# Patient Record
Sex: MALE | Race: WHITE | NOT HISPANIC OR LATINO | URBAN - METROPOLITAN AREA
[De-identification: names, ages, dates, MRNs, and addresses within clinical notes are randomized per-mention and may not be internally consistent; named-entity substitution may affect disease eponyms.]

---

## 2022-09-11 ENCOUNTER — INPATIENT HOSPITAL (OUTPATIENT)
Dept: URBAN - METROPOLITAN AREA HOSPITAL 54 | Facility: HOSPITAL | Age: 77
End: 2022-09-11
Payer: OTHER GOVERNMENT

## 2022-09-11 DIAGNOSIS — K92.1 MELENA: ICD-10-CM

## 2022-09-11 DIAGNOSIS — D62 ACUTE POSTHEMORRHAGIC ANEMIA: ICD-10-CM

## 2022-09-11 DIAGNOSIS — R71.0 PRECIPITOUS DROP IN HEMATOCRIT: ICD-10-CM

## 2022-09-11 PROCEDURE — 99222 1ST HOSP IP/OBS MODERATE 55: CPT | Performed by: INTERNAL MEDICINE

## 2022-09-12 ENCOUNTER — INPATIENT HOSPITAL (OUTPATIENT)
Dept: URBAN - METROPOLITAN AREA HOSPITAL 54 | Facility: HOSPITAL | Age: 77
End: 2022-09-12
Payer: OTHER GOVERNMENT

## 2022-09-12 DIAGNOSIS — K25.9 GASTRIC ULCER, UNSPECIFIED AS ACUTE OR CHRONIC, WITHOUT HEMO: ICD-10-CM

## 2022-09-12 DIAGNOSIS — K92.1 MELENA: ICD-10-CM

## 2022-09-12 DIAGNOSIS — K64.8 OTHER HEMORRHOIDS: ICD-10-CM

## 2022-09-12 DIAGNOSIS — K29.30 CHRONIC SUPERFICIAL GASTRITIS WITHOUT BLEEDING: ICD-10-CM

## 2022-09-12 PROCEDURE — 43239 EGD BIOPSY SINGLE/MULTIPLE: CPT | Performed by: INTERNAL MEDICINE

## 2022-09-12 PROCEDURE — 45378 DIAGNOSTIC COLONOSCOPY: CPT | Performed by: INTERNAL MEDICINE

## 2022-09-13 ENCOUNTER — INPATIENT HOSPITAL (OUTPATIENT)
Dept: URBAN - METROPOLITAN AREA HOSPITAL 54 | Facility: HOSPITAL | Age: 77
End: 2022-09-13
Payer: OTHER GOVERNMENT

## 2022-09-13 DIAGNOSIS — K64.8 OTHER HEMORRHOIDS: ICD-10-CM

## 2022-09-13 DIAGNOSIS — K29.30 CHRONIC SUPERFICIAL GASTRITIS WITHOUT BLEEDING: ICD-10-CM

## 2022-09-13 DIAGNOSIS — K25.9 GASTRIC ULCER, UNSPECIFIED AS ACUTE OR CHRONIC, WITHOUT HEMO: ICD-10-CM

## 2022-09-13 DIAGNOSIS — D62 ACUTE POSTHEMORRHAGIC ANEMIA: ICD-10-CM

## 2022-09-13 PROCEDURE — 99232 SBSQ HOSP IP/OBS MODERATE 35: CPT | Performed by: PHYSICIAN ASSISTANT

## 2022-09-14 ENCOUNTER — INPATIENT HOSPITAL (OUTPATIENT)
Dept: URBAN - METROPOLITAN AREA HOSPITAL 54 | Facility: HOSPITAL | Age: 77
End: 2022-09-14
Payer: OTHER GOVERNMENT

## 2022-09-14 DIAGNOSIS — K25.9 GASTRIC ULCER, UNSPECIFIED AS ACUTE OR CHRONIC, WITHOUT HEMO: ICD-10-CM

## 2022-09-14 DIAGNOSIS — K29.30 CHRONIC SUPERFICIAL GASTRITIS WITHOUT BLEEDING: ICD-10-CM

## 2022-09-14 DIAGNOSIS — K64.8 OTHER HEMORRHOIDS: ICD-10-CM

## 2022-09-14 DIAGNOSIS — D62 ACUTE POSTHEMORRHAGIC ANEMIA: ICD-10-CM

## 2022-09-14 PROCEDURE — 99232 SBSQ HOSP IP/OBS MODERATE 35: CPT

## 2022-09-22 ENCOUNTER — INPATIENT HOSPITAL (OUTPATIENT)
Dept: URBAN - METROPOLITAN AREA HOSPITAL 54 | Facility: HOSPITAL | Age: 77
End: 2022-09-22
Payer: OTHER GOVERNMENT

## 2022-09-22 DIAGNOSIS — K92.1 MELENA: ICD-10-CM

## 2022-09-22 DIAGNOSIS — K25.9 GASTRIC ULCER, UNSPECIFIED AS ACUTE OR CHRONIC, WITHOUT HEMO: ICD-10-CM

## 2022-09-22 DIAGNOSIS — Z98.890 OTHER SPECIFIED POSTPROCEDURAL STATES: ICD-10-CM

## 2022-09-22 DIAGNOSIS — K31.89 OTHER DISEASES OF STOMACH AND DUODENUM: ICD-10-CM

## 2022-09-22 DIAGNOSIS — D62 ACUTE POSTHEMORRHAGIC ANEMIA: ICD-10-CM

## 2022-09-22 PROCEDURE — 91110 GI TRC IMG INTRAL ESOPH-ILE: CPT | Mod: 26 | Performed by: INTERNAL MEDICINE

## 2022-09-22 PROCEDURE — 44361 SMALL BOWEL ENDOSCOPY/BIOPSY: CPT | Performed by: INTERNAL MEDICINE

## 2022-09-22 PROCEDURE — 99222 1ST HOSP IP/OBS MODERATE 55: CPT | Mod: 25 | Performed by: PHYSICIAN ASSISTANT

## 2022-09-28 ENCOUNTER — OFFICE (OUTPATIENT)
Dept: URBAN - METROPOLITAN AREA CLINIC 18 | Facility: CLINIC | Age: 77
End: 2022-09-28
Payer: OTHER GOVERNMENT

## 2022-09-28 VITALS
HEIGHT: 67 IN | HEART RATE: 72 BPM | SYSTOLIC BLOOD PRESSURE: 140 MMHG | WEIGHT: 185 LBS | DIASTOLIC BLOOD PRESSURE: 66 MMHG

## 2022-09-28 DIAGNOSIS — K62.5 HEMORRHAGE OF ANUS AND RECTUM: ICD-10-CM

## 2022-09-28 LAB
CBC, PLATELET CT  AND  DIFF: ABS BASOPHIL: 0.1 K/UL
CBC, PLATELET CT  AND  DIFF: ABS EOSINOPHIL: 0.2 K/UL
CBC, PLATELET CT  AND  DIFF: ABS IMMATURE GRANS: 0 K/UL
CBC, PLATELET CT  AND  DIFF: ABS LYMPHOCYTE: 1 K/UL
CBC, PLATELET CT  AND  DIFF: ABS MONOCYTE: 0.7 K/UL
CBC, PLATELET CT  AND  DIFF: ABS NEUTROPHIL: 4.1 K/UL
CBC, PLATELET CT  AND  DIFF: BASOPHIL: 1 %
CBC, PLATELET CT  AND  DIFF: DIFFERENTIAL: (no result)
CBC, PLATELET CT  AND  DIFF: EOSINOPHIL: 2.7 %
CBC, PLATELET CT  AND  DIFF: HEMATOCRIT: 27 % — LOW
CBC, PLATELET CT  AND  DIFF: HEMOGLOBIN: 8.7 G/DL — LOW
CBC, PLATELET CT  AND  DIFF: IMMATURE GRANULOCYTES: 0.5 %
CBC, PLATELET CT  AND  DIFF: LYMPHOCYTE: 16.1 %
CBC, PLATELET CT  AND  DIFF: MCH: 28.2 PG
CBC, PLATELET CT  AND  DIFF: MCHC: 32.2 G/DL
CBC, PLATELET CT  AND  DIFF: MCV: 87.4 FL
CBC, PLATELET CT  AND  DIFF: MONOCYTE: 10.8 %
CBC, PLATELET CT  AND  DIFF: MPV: 10.2 FL
CBC, PLATELET CT  AND  DIFF: NEUTROPHIL: 68.9 %
CBC, PLATELET CT  AND  DIFF: NRBCS: 0 /100 WBC
CBC, PLATELET CT  AND  DIFF: PLATELET COUNT: 388 K/UL
CBC, PLATELET CT  AND  DIFF: RBC: 3.09 M/UL — LOW
CBC, PLATELET CT  AND  DIFF: RDW: 15.2 % — HIGH
CBC, PLATELET CT  AND  DIFF: WBC COUNT: 6 K/UL
IRON PROFILE: IRON BINDING CAPACITY: 290 MCG/DL
IRON PROFILE: IRON SATURATION: 24 %
IRON PROFILE: IRON: 69 MCG/DL

## 2022-09-28 PROCEDURE — 99214 OFFICE O/P EST MOD 30 MIN: CPT | Performed by: INTERNAL MEDICINE

## 2022-11-15 ENCOUNTER — OFFICE (OUTPATIENT)
Dept: URBAN - METROPOLITAN AREA CLINIC 18 | Facility: CLINIC | Age: 77
End: 2022-11-15
Payer: OTHER GOVERNMENT

## 2022-11-15 VITALS
WEIGHT: 186 LBS | OXYGEN SATURATION: 96 % | SYSTOLIC BLOOD PRESSURE: 162 MMHG | HEIGHT: 67 IN | DIASTOLIC BLOOD PRESSURE: 86 MMHG | HEART RATE: 63 BPM

## 2022-11-15 DIAGNOSIS — K62.5 HEMORRHAGE OF ANUS AND RECTUM: ICD-10-CM

## 2022-11-15 PROCEDURE — 99214 OFFICE O/P EST MOD 30 MIN: CPT | Performed by: INTERNAL MEDICINE

## 2023-01-03 ENCOUNTER — AMBULATORY SURGICAL CENTER (OUTPATIENT)
Dept: URBAN - METROPOLITAN AREA SURGERY 7 | Facility: SURGERY | Age: 78
End: 2023-01-03
Payer: OTHER GOVERNMENT

## 2023-01-03 DIAGNOSIS — D50.9 IRON DEFICIENCY ANEMIA, UNSPECIFIED: ICD-10-CM

## 2023-01-03 DIAGNOSIS — K64.0 FIRST DEGREE HEMORRHOIDS: ICD-10-CM

## 2023-01-03 DIAGNOSIS — K92.2 GASTROINTESTINAL HEMORRHAGE, UNSPECIFIED: ICD-10-CM

## 2023-01-03 PROCEDURE — 43235 EGD DIAGNOSTIC BRUSH WASH: CPT | Performed by: INTERNAL MEDICINE

## 2023-01-03 PROCEDURE — 45378 DIAGNOSTIC COLONOSCOPY: CPT | Mod: 53 | Performed by: INTERNAL MEDICINE

## 2023-01-03 PROCEDURE — 45378 DIAGNOSTIC COLONOSCOPY: CPT | Mod: 74 | Performed by: INTERNAL MEDICINE

## 2023-01-03 NOTE — SERVICEHPINOTES
76-year-old male comes for evaluation of recent GI bleed. Patient was admitted to the hospital with rectal bleeding, significant drop in hemoglobin requiring 2 units of packed red blood cells, hemoglobin went down to almost 6 range earlier in September 2022, s/p 2 units of packed red blood cells and IV iron. He underwent endoscopy as noted belowbrEGD September 2022 showed clean base antral ulcer.brGastric biopsies negative for H. pyloribrColonoscopy September 2022 with internal hemorrhoids, large amount of old blood clots.brSmall bowel capsule endoscopy September 2022 showed normal small bowel exam to cecum. 
br   no symptoms.

## 2023-02-07 VITALS
HEART RATE: 89 BPM | OXYGEN SATURATION: 99 % | OXYGEN SATURATION: 95 % | DIASTOLIC BLOOD PRESSURE: 69 MMHG | SYSTOLIC BLOOD PRESSURE: 165 MMHG | SYSTOLIC BLOOD PRESSURE: 131 MMHG | SYSTOLIC BLOOD PRESSURE: 107 MMHG | DIASTOLIC BLOOD PRESSURE: 72 MMHG | OXYGEN SATURATION: 98 % | HEART RATE: 66 BPM | OXYGEN SATURATION: 94 % | DIASTOLIC BLOOD PRESSURE: 62 MMHG | OXYGEN SATURATION: 96 % | HEART RATE: 89 BPM | RESPIRATION RATE: 12 BRPM | OXYGEN SATURATION: 94 % | DIASTOLIC BLOOD PRESSURE: 84 MMHG | RESPIRATION RATE: 13 BRPM | OXYGEN SATURATION: 95 % | SYSTOLIC BLOOD PRESSURE: 114 MMHG | DIASTOLIC BLOOD PRESSURE: 67 MMHG | SYSTOLIC BLOOD PRESSURE: 122 MMHG | HEART RATE: 64 BPM | DIASTOLIC BLOOD PRESSURE: 91 MMHG | OXYGEN SATURATION: 96 % | SYSTOLIC BLOOD PRESSURE: 127 MMHG | RESPIRATION RATE: 16 BRPM | HEART RATE: 66 BPM | RESPIRATION RATE: 13 BRPM | SYSTOLIC BLOOD PRESSURE: 123 MMHG | DIASTOLIC BLOOD PRESSURE: 73 MMHG | HEART RATE: 65 BPM | DIASTOLIC BLOOD PRESSURE: 62 MMHG | RESPIRATION RATE: 18 BRPM | SYSTOLIC BLOOD PRESSURE: 104 MMHG | HEART RATE: 65 BPM | SYSTOLIC BLOOD PRESSURE: 114 MMHG | SYSTOLIC BLOOD PRESSURE: 165 MMHG | DIASTOLIC BLOOD PRESSURE: 91 MMHG | SYSTOLIC BLOOD PRESSURE: 122 MMHG | HEART RATE: 68 BPM | DIASTOLIC BLOOD PRESSURE: 67 MMHG | SYSTOLIC BLOOD PRESSURE: 127 MMHG | TEMPERATURE: 98.3 F | DIASTOLIC BLOOD PRESSURE: 73 MMHG | RESPIRATION RATE: 15 BRPM | OXYGEN SATURATION: 99 % | HEIGHT: 67 IN | TEMPERATURE: 98.3 F | SYSTOLIC BLOOD PRESSURE: 105 MMHG | SYSTOLIC BLOOD PRESSURE: 157 MMHG | HEART RATE: 69 BPM | RESPIRATION RATE: 14 BRPM | SYSTOLIC BLOOD PRESSURE: 123 MMHG | HEART RATE: 64 BPM | SYSTOLIC BLOOD PRESSURE: 105 MMHG | RESPIRATION RATE: 12 BRPM | DIASTOLIC BLOOD PRESSURE: 72 MMHG | OXYGEN SATURATION: 93 % | HEART RATE: 74 BPM | HEART RATE: 69 BPM | DIASTOLIC BLOOD PRESSURE: 84 MMHG | RESPIRATION RATE: 16 BRPM | RESPIRATION RATE: 18 BRPM | RESPIRATION RATE: 15 BRPM | OXYGEN SATURATION: 98 % | SYSTOLIC BLOOD PRESSURE: 104 MMHG | HEART RATE: 74 BPM | DIASTOLIC BLOOD PRESSURE: 69 MMHG | SYSTOLIC BLOOD PRESSURE: 157 MMHG | OXYGEN SATURATION: 93 % | HEIGHT: 67 IN | SYSTOLIC BLOOD PRESSURE: 107 MMHG | SYSTOLIC BLOOD PRESSURE: 131 MMHG | HEART RATE: 68 BPM | RESPIRATION RATE: 14 BRPM

## 2023-04-06 ENCOUNTER — OFFICE (OUTPATIENT)
Dept: URBAN - METROPOLITAN AREA CLINIC 18 | Facility: CLINIC | Age: 78
End: 2023-04-06
Payer: OTHER GOVERNMENT

## 2023-04-06 VITALS
DIASTOLIC BLOOD PRESSURE: 84 MMHG | WEIGHT: 186 LBS | HEIGHT: 67 IN | HEART RATE: 63 BPM | SYSTOLIC BLOOD PRESSURE: 126 MMHG

## 2023-04-06 DIAGNOSIS — D62 ACUTE POSTHEMORRHAGIC ANEMIA: ICD-10-CM

## 2023-04-06 DIAGNOSIS — K59.00 CONSTIPATION, UNSPECIFIED: ICD-10-CM

## 2023-04-06 DIAGNOSIS — K62.5 HEMORRHAGE OF ANUS AND RECTUM: ICD-10-CM

## 2023-04-06 PROCEDURE — 99213 OFFICE O/P EST LOW 20 MIN: CPT | Performed by: NURSE PRACTITIONER

## 2023-05-12 ENCOUNTER — AMBULATORY SURGICAL CENTER (OUTPATIENT)
Dept: URBAN - METROPOLITAN AREA SURGERY 7 | Facility: SURGERY | Age: 78
End: 2023-05-12

## 2023-05-12 VITALS
OXYGEN SATURATION: 94 % | SYSTOLIC BLOOD PRESSURE: 160 MMHG | WEIGHT: 185 LBS | OXYGEN SATURATION: 99 % | SYSTOLIC BLOOD PRESSURE: 157 MMHG | HEART RATE: 58 BPM | SYSTOLIC BLOOD PRESSURE: 126 MMHG | SYSTOLIC BLOOD PRESSURE: 129 MMHG | DIASTOLIC BLOOD PRESSURE: 75 MMHG | DIASTOLIC BLOOD PRESSURE: 72 MMHG | SYSTOLIC BLOOD PRESSURE: 113 MMHG | RESPIRATION RATE: 21 BRPM | SYSTOLIC BLOOD PRESSURE: 132 MMHG | TEMPERATURE: 97.3 F | HEART RATE: 60 BPM | DIASTOLIC BLOOD PRESSURE: 64 MMHG | SYSTOLIC BLOOD PRESSURE: 121 MMHG | SYSTOLIC BLOOD PRESSURE: 115 MMHG | HEART RATE: 54 BPM | DIASTOLIC BLOOD PRESSURE: 72 MMHG | DIASTOLIC BLOOD PRESSURE: 76 MMHG | OXYGEN SATURATION: 100 % | RESPIRATION RATE: 19 BRPM | HEART RATE: 57 BPM | HEART RATE: 59 BPM | HEART RATE: 61 BPM | DIASTOLIC BLOOD PRESSURE: 86 MMHG | OXYGEN SATURATION: 98 % | HEART RATE: 56 BPM | SYSTOLIC BLOOD PRESSURE: 133 MMHG | SYSTOLIC BLOOD PRESSURE: 169 MMHG | HEART RATE: 54 BPM | OXYGEN SATURATION: 96 % | HEART RATE: 58 BPM | RESPIRATION RATE: 9 BRPM | HEART RATE: 66 BPM | HEIGHT: 67 IN | OXYGEN SATURATION: 100 % | SYSTOLIC BLOOD PRESSURE: 133 MMHG | RESPIRATION RATE: 9 BRPM | HEART RATE: 61 BPM | SYSTOLIC BLOOD PRESSURE: 169 MMHG | OXYGEN SATURATION: 97 % | DIASTOLIC BLOOD PRESSURE: 64 MMHG | OXYGEN SATURATION: 98 % | HEART RATE: 59 BPM | RESPIRATION RATE: 16 BRPM | SYSTOLIC BLOOD PRESSURE: 154 MMHG | SYSTOLIC BLOOD PRESSURE: 132 MMHG | RESPIRATION RATE: 23 BRPM | HEART RATE: 67 BPM | OXYGEN SATURATION: 94 % | OXYGEN SATURATION: 99 % | DIASTOLIC BLOOD PRESSURE: 54 MMHG | SYSTOLIC BLOOD PRESSURE: 160 MMHG | SYSTOLIC BLOOD PRESSURE: 124 MMHG | SYSTOLIC BLOOD PRESSURE: 121 MMHG | HEART RATE: 66 BPM | HEART RATE: 57 BPM | HEART RATE: 69 BPM | SYSTOLIC BLOOD PRESSURE: 113 MMHG | SYSTOLIC BLOOD PRESSURE: 154 MMHG | DIASTOLIC BLOOD PRESSURE: 78 MMHG | DIASTOLIC BLOOD PRESSURE: 70 MMHG | RESPIRATION RATE: 20 BRPM | HEART RATE: 67 BPM | SYSTOLIC BLOOD PRESSURE: 119 MMHG | DIASTOLIC BLOOD PRESSURE: 68 MMHG | DIASTOLIC BLOOD PRESSURE: 81 MMHG | RESPIRATION RATE: 23 BRPM | DIASTOLIC BLOOD PRESSURE: 75 MMHG | HEART RATE: 60 BPM | SYSTOLIC BLOOD PRESSURE: 115 MMHG | RESPIRATION RATE: 21 BRPM | DIASTOLIC BLOOD PRESSURE: 76 MMHG | DIASTOLIC BLOOD PRESSURE: 70 MMHG | DIASTOLIC BLOOD PRESSURE: 74 MMHG | DIASTOLIC BLOOD PRESSURE: 78 MMHG | SYSTOLIC BLOOD PRESSURE: 119 MMHG | HEIGHT: 67 IN | HEART RATE: 69 BPM | RESPIRATION RATE: 19 BRPM | OXYGEN SATURATION: 97 % | SYSTOLIC BLOOD PRESSURE: 126 MMHG | SYSTOLIC BLOOD PRESSURE: 129 MMHG | TEMPERATURE: 97.3 F | DIASTOLIC BLOOD PRESSURE: 86 MMHG | SYSTOLIC BLOOD PRESSURE: 124 MMHG | WEIGHT: 185 LBS | RESPIRATION RATE: 16 BRPM | DIASTOLIC BLOOD PRESSURE: 69 MMHG | SYSTOLIC BLOOD PRESSURE: 157 MMHG | DIASTOLIC BLOOD PRESSURE: 69 MMHG | OXYGEN SATURATION: 96 % | DIASTOLIC BLOOD PRESSURE: 54 MMHG | HEART RATE: 56 BPM | DIASTOLIC BLOOD PRESSURE: 81 MMHG | RESPIRATION RATE: 20 BRPM | DIASTOLIC BLOOD PRESSURE: 74 MMHG | DIASTOLIC BLOOD PRESSURE: 68 MMHG

## 2023-05-12 DIAGNOSIS — D50.9 IRON DEFICIENCY ANEMIA, UNSPECIFIED: ICD-10-CM

## 2023-05-12 DIAGNOSIS — K62.5 HEMORRHAGE OF ANUS AND RECTUM: ICD-10-CM

## 2023-05-12 DIAGNOSIS — K64.0 FIRST DEGREE HEMORRHOIDS: ICD-10-CM

## 2023-05-12 PROCEDURE — 45378 DIAGNOSTIC COLONOSCOPY: CPT | Mod: 22 | Performed by: INTERNAL MEDICINE

## 2023-08-18 ENCOUNTER — OFFICE (OUTPATIENT)
Dept: URBAN - METROPOLITAN AREA CLINIC 18 | Facility: CLINIC | Age: 78
End: 2023-08-18

## 2023-08-18 VITALS
SYSTOLIC BLOOD PRESSURE: 130 MMHG | WEIGHT: 185 LBS | DIASTOLIC BLOOD PRESSURE: 88 MMHG | HEIGHT: 67 IN | HEART RATE: 71 BPM

## 2023-08-18 DIAGNOSIS — K62.5 HEMORRHAGE OF ANUS AND RECTUM: ICD-10-CM

## 2023-08-18 DIAGNOSIS — K25.9 GASTRIC ULCER, UNSPECIFIED AS ACUTE OR CHRONIC, WITHOUT HEMO: ICD-10-CM

## 2023-08-18 DIAGNOSIS — D64.9 ANEMIA, UNSPECIFIED: ICD-10-CM

## 2023-08-18 DIAGNOSIS — K64.0 FIRST DEGREE HEMORRHOIDS: ICD-10-CM

## 2023-08-18 PROCEDURE — 99213 OFFICE O/P EST LOW 20 MIN: CPT | Performed by: NURSE PRACTITIONER

## 2023-11-21 ENCOUNTER — OFFICE (OUTPATIENT)
Dept: URBAN - METROPOLITAN AREA CLINIC 18 | Facility: CLINIC | Age: 78
End: 2023-11-21

## 2023-11-21 VITALS
OXYGEN SATURATION: 95 % | WEIGHT: 185 LBS | SYSTOLIC BLOOD PRESSURE: 138 MMHG | HEIGHT: 67 IN | DIASTOLIC BLOOD PRESSURE: 68 MMHG | HEART RATE: 69 BPM

## 2023-11-21 DIAGNOSIS — K59.09 OTHER CONSTIPATION: ICD-10-CM

## 2023-11-21 DIAGNOSIS — D64.9 ANEMIA, UNSPECIFIED: ICD-10-CM

## 2023-11-21 PROCEDURE — 99213 OFFICE O/P EST LOW 20 MIN: CPT | Performed by: INTERNAL MEDICINE

## 2024-06-12 ENCOUNTER — OFFICE (OUTPATIENT)
Dept: URBAN - METROPOLITAN AREA CLINIC 18 | Facility: CLINIC | Age: 79
End: 2024-06-12
Payer: MEDICARE

## 2024-06-12 VITALS
DIASTOLIC BLOOD PRESSURE: 72 MMHG | SYSTOLIC BLOOD PRESSURE: 142 MMHG | HEIGHT: 67 IN | WEIGHT: 190 LBS | HEART RATE: 88 BPM

## 2024-06-12 DIAGNOSIS — D64.9 ANEMIA, UNSPECIFIED: ICD-10-CM

## 2024-06-12 DIAGNOSIS — K64.0 FIRST DEGREE HEMORRHOIDS: ICD-10-CM

## 2024-06-12 DIAGNOSIS — K59.09 OTHER CONSTIPATION: ICD-10-CM

## 2024-06-12 DIAGNOSIS — K25.9 GASTRIC ULCER, UNSPECIFIED AS ACUTE OR CHRONIC, WITHOUT HEMO: ICD-10-CM

## 2024-06-12 PROCEDURE — 99213 OFFICE O/P EST LOW 20 MIN: CPT | Performed by: NURSE PRACTITIONER
